# Patient Record
(demographics unavailable — no encounter records)

---

## 2024-11-13 NOTE — REVIEW OF SYSTEMS
[Recent Change In Weight] : ~T recent weight change [Back Pain] : back pain [Negative] : Genitourinary [Constipation] : no constipation [FreeTextEntry2] : Lost weight in the past few months- intentional.  [FreeTextEntry5] : no exertional chest pain [FreeTextEntry6] : As per HPI [FreeTextEntry7] : slower evacuation [FreeTextEntry9] : As per HPI [de-identified] : As oer HPI

## 2024-11-13 NOTE — HISTORY OF PRESENT ILLNESS
[FreeTextEntry1] :  Establish care. Pt wants to discuss about his chest pain and pressure. pt don't feel well months ago started noticed dizziness and blurry vision. [de-identified] : Mr. WASCAR REYES is a 39 year old male here today to establish care. Occasional episodes of standing imbalance, headache ( brief)  and vision loss (seconds) over the past few months. Feels like a "rush".  Rare sharp pain in the left side of the chest for several months. Non exertional. No accompanying sxs.  Drinking less. c/o SOB occasionally since getting his Covid vaccine 2 years, Says he gets a pinching in the arm where he got the vaccine ( started a few months ago, ) Flu: No CARMEN: Yes Last eye exam 4-5 yrs

## 2024-11-13 NOTE — HEALTH RISK ASSESSMENT
[Good] : ~his/her~  mood as  good [Never (0 pts)] : Never (0 points) [1 or 2 (0 pts)] : 1 or 2 (0 points) [No falls in past year] : Patient reported no falls in the past year [0] : 2) Feeling down, depressed, or hopeless: Not at all (0) [With Family] : lives with family [Employed] : employed [Feels Safe at Home] : Feels safe at home [Fully functional (bathing, dressing, toileting, transferring, walking, feeding)] : Fully functional (bathing, dressing, toileting, transferring, walking, feeding) [Fully functional (using the telephone, shopping, preparing meals, housekeeping, doing laundry, using] : Fully functional and needs no help or supervision to perform IADLs (using the telephone, shopping, preparing meals, housekeeping, doing laundry, using transportation, managing medications and managing finances) [Reports normal functional visual acuity (ie: able to read med bottle)] : Reports normal functional visual acuity [Never] : Never [NO] : No [No] : In the past 12 months have you used drugs other than those required for medical reasons? No [PHQ-2 Negative - No further assessment needed] : PHQ-2 Negative - No further assessment needed [HIV test declined] : HIV test declined [Hepatitis C test declined] : Hepatitis C test declined [None] : None [College] : College [] :  [# Of Children ___] : has [unfilled] children [Audit-CScore] : 0 [de-identified] : walking and basketball [de-identified] : improving.  [[id number]] : 0 [Change in mental status noted] : No change in mental status noted [Reports changes in hearing] : Reports no changes in hearing [Reports changes in vision] : Reports no changes in vision [Reports changes in dental health] : Reports no changes in dental health [FreeTextEntry2] : [facility]

## 2024-11-13 NOTE — PLAN
[FreeTextEntry1] : Discussed importance of monthly self examination of the testicles. Instructed patient on proper way to examine himself. Advised a full eye examination.